# Patient Record
Sex: FEMALE | Race: WHITE | NOT HISPANIC OR LATINO | Employment: FULL TIME | URBAN - METROPOLITAN AREA
[De-identification: names, ages, dates, MRNs, and addresses within clinical notes are randomized per-mention and may not be internally consistent; named-entity substitution may affect disease eponyms.]

---

## 2017-03-28 ENCOUNTER — APPOINTMENT (OUTPATIENT)
Dept: LAB | Facility: HOSPITAL | Age: 42
End: 2017-03-28
Attending: OBSTETRICS & GYNECOLOGY
Payer: COMMERCIAL

## 2017-03-28 ENCOUNTER — TRANSCRIBE ORDERS (OUTPATIENT)
Dept: ADMINISTRATIVE | Facility: HOSPITAL | Age: 42
End: 2017-03-28

## 2017-03-28 DIAGNOSIS — N95.1 SYMPTOMATIC MENOPAUSAL OR FEMALE CLIMACTERIC STATES: Primary | ICD-10-CM

## 2017-03-28 LAB — VENIPUNCTURE: NORMAL

## 2017-03-28 PROCEDURE — 36415 COLL VENOUS BLD VENIPUNCTURE: CPT | Performed by: OBSTETRICS & GYNECOLOGY

## 2017-11-07 ENCOUNTER — ANESTHESIA EVENT (OUTPATIENT)
Dept: GASTROENTEROLOGY | Facility: AMBULARY SURGERY CENTER | Age: 42
End: 2017-11-07
Payer: COMMERCIAL

## 2017-11-07 RX ORDER — MULTIVITAMIN
1 TABLET ORAL DAILY
COMMUNITY

## 2017-11-07 NOTE — PRE-PROCEDURE INSTRUCTIONS
Pre-Surgery Instructions:   Medication Instructions    Multiple Vitamin (MULTIVITAMIN) tablet Patient was instructed by Physician and understands

## 2017-11-08 ENCOUNTER — HOSPITAL ENCOUNTER (OUTPATIENT)
Facility: AMBULARY SURGERY CENTER | Age: 42
Setting detail: OUTPATIENT SURGERY
Discharge: HOME | End: 2017-11-08
Attending: INTERNAL MEDICINE | Admitting: INTERNAL MEDICINE
Payer: COMMERCIAL

## 2017-11-08 ENCOUNTER — ANESTHESIA (OUTPATIENT)
Dept: GASTROENTEROLOGY | Facility: AMBULARY SURGERY CENTER | Age: 42
End: 2017-11-08
Payer: COMMERCIAL

## 2017-11-08 VITALS
BODY MASS INDEX: 21.9 KG/M2 | OXYGEN SATURATION: 99 % | WEIGHT: 144 LBS | RESPIRATION RATE: 18 BRPM | SYSTOLIC BLOOD PRESSURE: 115 MMHG | HEART RATE: 57 BPM | DIASTOLIC BLOOD PRESSURE: 59 MMHG | TEMPERATURE: 97.1 F

## 2017-11-08 DIAGNOSIS — Z80.0 FAMILY HISTORY OF MALIGNANT NEOPLASM OF DIGESTIVE ORGAN: ICD-10-CM

## 2017-11-08 DIAGNOSIS — Z86.010 HISTORY OF COLONIC POLYPS: ICD-10-CM

## 2017-11-08 PROCEDURE — 88305 TISSUE EXAM BY PATHOLOGIST: CPT | Performed by: INTERNAL MEDICINE

## 2017-11-08 RX ORDER — SODIUM CHLORIDE 9 MG/ML
125 INJECTION, SOLUTION INTRAVENOUS CONTINUOUS
Status: DISCONTINUED | OUTPATIENT
Start: 2017-11-08 | End: 2017-11-08 | Stop reason: HOSPADM

## 2017-11-08 RX ORDER — SODIUM CHLORIDE, SODIUM LACTATE, POTASSIUM CHLORIDE, CALCIUM CHLORIDE 600; 310; 30; 20 MG/100ML; MG/100ML; MG/100ML; MG/100ML
INJECTION, SOLUTION INTRAVENOUS CONTINUOUS PRN
Status: DISCONTINUED | OUTPATIENT
Start: 2017-11-08 | End: 2017-11-08 | Stop reason: SURG

## 2017-11-08 RX ORDER — PROPOFOL 10 MG/ML
INJECTION, EMULSION INTRAVENOUS AS NEEDED
Status: DISCONTINUED | OUTPATIENT
Start: 2017-11-08 | End: 2017-11-08 | Stop reason: SURG

## 2017-11-08 RX ADMIN — PROPOFOL 20 MG: 10 INJECTION, EMULSION INTRAVENOUS at 08:19

## 2017-11-08 RX ADMIN — PROPOFOL 20 MG: 10 INJECTION, EMULSION INTRAVENOUS at 08:25

## 2017-11-08 RX ADMIN — PROPOFOL 20 MG: 10 INJECTION, EMULSION INTRAVENOUS at 08:27

## 2017-11-08 RX ADMIN — PROPOFOL 20 MG: 10 INJECTION, EMULSION INTRAVENOUS at 08:21

## 2017-11-08 RX ADMIN — PROPOFOL 20 MG: 10 INJECTION, EMULSION INTRAVENOUS at 08:16

## 2017-11-08 RX ADMIN — SODIUM CHLORIDE, SODIUM LACTATE, POTASSIUM CHLORIDE, AND CALCIUM CHLORIDE: .6; .31; .03; .02 INJECTION, SOLUTION INTRAVENOUS at 08:00

## 2017-11-08 RX ADMIN — PROPOFOL 20 MG: 10 INJECTION, EMULSION INTRAVENOUS at 08:17

## 2017-11-08 RX ADMIN — PROPOFOL 20 MG: 10 INJECTION, EMULSION INTRAVENOUS at 08:23

## 2017-11-08 RX ADMIN — PROPOFOL 80 MG: 10 INJECTION, EMULSION INTRAVENOUS at 08:15

## 2017-11-08 NOTE — OP NOTE
OPERATIVE REPORT  PATIENT NAME: Carolyne Barksdale    :  1975  MRN: [id number]  Pt Location: [place] [address] GI ROOM 01    SURGERY DATE: 2017    Surgeon(s) and Role:     Mona Burrell MD - Primary    Preop Diagnosis:  History of colonic polyps [Z86 010]  Family history of malignant neoplasm of digestive organ [Z80 0]  Abdominal pain    Post-Op Diagnosis Codes:     * History of colonic polyps [Z86 010]     * Family history of malignant neoplasm of digestive organ [Z80 0]     * Internal hemorrhoid [K64 8]    Procedure(s) (LRB):  COLONOSCOPY (N/A) with cold biopsy polypectomy    Specimen(s):  ID Type Source Tests Collected by Time Destination   1 : bx terminal ileum Tissue Large Intestine, Cecum TISSUE EXAM Shalonda Cerna MD 2017 0725    2 : sigmoid polyp Tissue Large Intestine, Sigmoid Colon TISSUE EXAM Shalonda Cerna MD 2017 [id number]        Estimated Blood Loss:   Minimal        Anesthesia Type:   IV Sedation with Anesthesia    Operative Indications:  History of colonic polyps [Z86 010]  Family history of malignant neoplasm of digestive organ [Z80 0]      Operative Findings:  Colonoscopy-under conscious sedation, digital rectal exam and perianal examination was done  Pediatric colonoscope was passed from anus and reach all the way up to the cecum  Cecum landmark was identified with ileocecal valve and appendiceal orifice  Quality of prep was good  Terminal ileum was intubated which appeared normal   Multiple biopsies were done to rule out Crohn's disease  Cecum, ascending colon, transverse colon, splenic flexure, descending colon and sigmoid colon mucosa appeared normal  Small diminutive polyp in the sigmoid colon which was removed with cold biopsy polypectomy  In the rectum on retroflexion, there is a small internal hemorrhoid    Impression-1  Diminutive colon polyp  2  Normal colonic mucosa, normal terminal ileum    Recommendation-1  Follow-up biopsies  2  Repeat colonoscopy in 5 years  3    Schedule for EGD for GERD, abdominal pain and bloating    Complications:   None    Patient Disposition:  PACU     SIGNATURE: Jalyn Laurent MD  DATE: November 8, 2017  TIME: 8:38 AM

## 2017-11-08 NOTE — ANESTHESIA POSTPROCEDURE EVALUATION
Post-Op Assessment Note      CV Status:  Stable    Mental Status:  Alert and awake    Hydration Status:  Euvolemic    PONV Controlled:  Controlled    Airway Patency:  Patent    Post Op Vitals Reviewed: Yes          Staff: Anesthesiologist           BP (!) 94/45 (11/08/17 0831)    Temp     Pulse 56 (11/08/17 0831)   Resp 18 (11/08/17 0831)    SpO2 98 % (11/08/17 0831)

## 2017-11-08 NOTE — ANESTHESIA PREPROCEDURE EVALUATION
Review of Systems/Medical History  Patient summary reviewed  Chart reviewed  No history of anesthetic complications     Cardiovascular   Pulmonary  Smoker cigarette smoker 5-10 packs per year , Tobacco cessation counseling given, ,        GI/Hepatic    GERD ,             Endo/Other     GYN       Hematology   Musculoskeletal       Neurology   Psychology           Physical Exam    Airway    Mallampati score: II  TM Distance: >3 FB  Neck ROM: full     Dental   No notable dental hx     Cardiovascular  Cardiovascular exam normal    Pulmonary  Pulmonary exam normal     Other Findings        Anesthesia Plan  ASA Score- 2       Anesthesia Type- IV sedation with anesthesia with ASA Monitors  Additional Monitors:   Airway Plan:           Induction- intravenous        Informed Consent-

## 2017-11-08 NOTE — H&P
History and Physical -  [facility]  Jaime Musa 38 y o  female MRN: [id number]    HPI: Jaime Musa is a 38y o  year old female who presents with history of colon polyp      Review of Systems    Historical Information   Past Medical History:   Diagnosis Date    GERD (gastroesophageal reflux disease)      Past Surgical History:   Procedure Laterality Date    COLONOSCOPY      OOPHORECTOMY Right     2001     Social History   History   Alcohol Use    Yes     Comment: socially on weekends     History   Drug Use No     History   Smoking Status    Current Some Day Smoker    Packs/day: 0 50    Years: 20 00    Types: Cigarettes   Smokeless Tobacco    Never Used     History reviewed  No pertinent family history  Meds/Allergies     Prescriptions Prior to Admission   Medication    Multiple Vitamin (MULTIVITAMIN) tablet    naproxen (NAPROSYN) 500 mg tablet       Allergies   Allergen Reactions    Shellfish-Derived Products        Objective     Blood pressure 100/63, pulse 57, temperature (!) 97 1 °F (36 2 °C), temperature source Tympanic, resp  rate 18, weight 65 3 kg (144 lb), SpO2 99 %, not currently breastfeeding        PHYSICAL EXAM    Gen: NAD  CV: RRR  CHEST: Clear  ABD: soft, NT/ND  EXT: no edema  Neuro: AAO      ASSESSMENT/PLAN:  This is a 38y o  year old female here for elective colonoscopy     PLAN:   Procedure:  Risk and benefit was discuss and will proceed with conscious sedation

## 2021-12-16 ENCOUNTER — OFFICE VISIT (OUTPATIENT)
Dept: URGENT CARE | Facility: CLINIC | Age: 46
End: 2021-12-16
Payer: COMMERCIAL

## 2021-12-16 VITALS
TEMPERATURE: 99.4 F | BODY MASS INDEX: 21.29 KG/M2 | DIASTOLIC BLOOD PRESSURE: 72 MMHG | HEART RATE: 104 BPM | SYSTOLIC BLOOD PRESSURE: 110 MMHG | WEIGHT: 140 LBS | RESPIRATION RATE: 16 BRPM | OXYGEN SATURATION: 98 %

## 2021-12-16 DIAGNOSIS — R68.89 FLU-LIKE SYMPTOMS: Primary | ICD-10-CM

## 2021-12-16 PROCEDURE — 99213 OFFICE O/P EST LOW 20 MIN: CPT | Performed by: FAMILY MEDICINE

## 2021-12-16 RX ORDER — ACETAMINOPHEN 325 MG/1
650 TABLET ORAL EVERY 6 HOURS PRN
COMMUNITY

## 2021-12-16 RX ORDER — OSELTAMIVIR PHOSPHATE 75 MG/1
75 CAPSULE ORAL EVERY 12 HOURS SCHEDULED
Qty: 10 CAPSULE | Refills: 0 | Status: SHIPPED | OUTPATIENT
Start: 2021-12-16 | End: 2021-12-21

## 2021-12-28 ENCOUNTER — APPOINTMENT (OUTPATIENT)
Dept: RADIOLOGY | Facility: CLINIC | Age: 46
End: 2021-12-28
Payer: COMMERCIAL

## 2021-12-28 DIAGNOSIS — R05.1 ACUTE COUGH: ICD-10-CM

## 2021-12-28 PROCEDURE — 71046 X-RAY EXAM CHEST 2 VIEWS: CPT

## 2022-03-28 ENCOUNTER — TELEPHONE (OUTPATIENT)
Dept: OTHER | Facility: OTHER | Age: 47
End: 2022-03-28

## 2022-05-13 ENCOUNTER — OFFICE VISIT (OUTPATIENT)
Dept: URGENT CARE | Facility: CLINIC | Age: 47
End: 2022-05-13
Payer: COMMERCIAL

## 2022-05-13 VITALS
TEMPERATURE: 97.5 F | SYSTOLIC BLOOD PRESSURE: 138 MMHG | HEIGHT: 66 IN | RESPIRATION RATE: 16 BRPM | OXYGEN SATURATION: 98 % | BODY MASS INDEX: 22.5 KG/M2 | WEIGHT: 140 LBS | DIASTOLIC BLOOD PRESSURE: 73 MMHG | HEART RATE: 62 BPM

## 2022-05-13 DIAGNOSIS — J02.9 SORE THROAT: Primary | ICD-10-CM

## 2022-05-13 PROCEDURE — 99213 OFFICE O/P EST LOW 20 MIN: CPT | Performed by: FAMILY MEDICINE

## 2022-05-13 NOTE — PROGRESS NOTES
[facility]        NAME: Hollie Irwin is a 41 y o  female  : 1975    MRN: [id number]  DATE: May 13, 2022  TIME: 4:18 PM    Assessment and Plan   Sore throat [J02 9]  1  Sore throat  phenol (CHLORASEPTIC) 1 4 % mucosal liquid     Exam mostly normal except for mild inflammation of the nasal cavity  Sore throat likely secondary to postnasal drip  Chloraseptic throat spray for relief  Advised on gargling with warm salt water and to hydrate  Patient Instructions     Follow up with PCP in 3-5 days  Proceed to  ER if symptoms worsen  Chief Complaint     Chief Complaint   Patient presents with    Cold Like Symptoms     Headache, cough, throat dry scratchy 1 day         History of Present Illness     73-year-old female presents today with URI symptoms including sore throat, coughing with burning chest pain and some postnasal drip  Also reports having a severe headache associated with lightheadedness and some visual disturbance  All symptoms started this morning upon waking up  Reports having occasional headaches comparable to a current symptoms over the past few months requiring eyedrops for relief; unsure of eyedrop type  Review of Systems   Review of Systems   Constitutional: Negative for chills and fever  HENT: Positive for postnasal drip and sore throat  Negative for congestion, rhinorrhea and voice change  Eyes: Positive for visual disturbance  Respiratory: Positive for cough  Negative for shortness of breath and wheezing  Cardiovascular: Positive for chest pain (burn with coughs)  Gastrointestinal: Negative for abdominal pain and nausea  Neurological: Positive for light-headedness and headaches       Current Medications       Current Outpatient Medications:     Multiple Vitamin (MULTIVITAMIN) tablet, Take 1 tablet by mouth daily, Disp: , Rfl:     phenol (CHLORASEPTIC) 1 4 % mucosal liquid, Apply 1 spray to the mouth or throat every 2 (two) hours as needed (Sore throat), Disp: 20 mL, Rfl: 0    acetaminophen (TYLENOL) 325 mg tablet, Take 650 mg by mouth every 6 (six) hours as needed for mild pain, Disp: , Rfl:     Current Allergies     Allergies as of 05/13/2022 - Reviewed 05/13/2022   Allergen Reaction Noted    Povidone iodine Other (See Comments) 12/16/2021    Shellfish-derived products - food allergy  10/03/2016            The following portions of the patient's history were reviewed and updated as appropriate: allergies, current medications, past family history, past medical history, past social history, past surgical history and problem list      Past Medical History:   Diagnosis Date    GERD (gastroesophageal reflux disease)        Past Surgical History:   Procedure Laterality Date    COLONOSCOPY      OOPHORECTOMY Right     2001    [place] COLSC FLX W/RMVL OF TUMOR POLYP LESION SNARE TQ N/A 11/8/2017    Procedure: COLONOSCOPY;  Surgeon: Jose Miguel Beasley MD;  Location: [place] [address] GI LAB; Service: Gastroenterology       No family history on file  Medications have been verified  Objective   /73   Pulse 62   Temp 97 5 °F (36 4 °C)   Resp 16   Ht 5' 6" (1 676 m)   Wt 63 5 kg (140 lb)   SpO2 98%   BMI 22 60 kg/m²   No LMP recorded  Patient is postmenopausal        Physical Exam     Physical Exam  Vitals and nursing note reviewed  Constitutional:       General: She is not in acute distress  Appearance: Normal appearance  She is normal weight  She is not ill-appearing, toxic-appearing or diaphoretic  HENT:      Head: Normocephalic and atraumatic  Nose: No congestion or rhinorrhea  Comments: Mildly inflamed nasal mucosa     Mouth/Throat:      Mouth: Mucous membranes are moist       Pharynx: Oropharynx is clear  No posterior oropharyngeal erythema  Eyes:      General:         Right eye: No discharge  Left eye: No discharge        Conjunctiva/sclera: Conjunctivae normal    Cardiovascular:      Rate and Rhythm: Normal rate and regular rhythm  Pulmonary:      Effort: Pulmonary effort is normal  No respiratory distress  Breath sounds: Normal breath sounds  No wheezing, rhonchi or rales  Skin:     General: Skin is warm  Findings: No erythema  Neurological:      General: No focal deficit present  Mental Status: She is alert and oriented to person, place, and time  Psychiatric:         Mood and Affect: Mood normal          Behavior: Behavior normal          Thought Content:  Thought content normal          Judgment: Judgment normal

## 2022-05-17 ENCOUNTER — OFFICE VISIT (OUTPATIENT)
Dept: URGENT CARE | Facility: CLINIC | Age: 47
End: 2022-05-17
Payer: COMMERCIAL

## 2022-05-17 VITALS
HEART RATE: 71 BPM | WEIGHT: 140 LBS | BODY MASS INDEX: 22.5 KG/M2 | TEMPERATURE: 98.6 F | HEIGHT: 66 IN | OXYGEN SATURATION: 98 %

## 2022-05-17 DIAGNOSIS — B34.9 ACUTE VIRAL DISEASE: Primary | ICD-10-CM

## 2022-05-17 PROCEDURE — 87636 SARSCOV2 & INF A&B AMP PRB: CPT | Performed by: PHYSICIAN ASSISTANT

## 2022-05-17 PROCEDURE — 99213 OFFICE O/P EST LOW 20 MIN: CPT | Performed by: PHYSICIAN ASSISTANT

## 2022-05-17 NOTE — PROGRESS NOTES
[facility]        NAME: Alfreda Guillen is a 41 y o  female  : 1975    MRN: [id number]  DATE: May 17, 2022  TIME: 6:14 PM    Assessment and Plan   Acute viral disease [B34 9]  1  Acute viral disease  Covid/Flu-Office Collect       Patient Instructions   Acute viral illness, COVID vs influenza  Combo swab done, mychart for results  Self isolation until results received  Recommend mucinex for cough/congestion  Can also use flonase nasal spray  Rest, fluids and supportive care  May benefit from a cool mist humidifier on night stand  Tylenol/ibuprofen as needed for pain/fever  Any worsening of symptoms or respiratory distress go to ER    Follow up with PCP in 3-5 days  Proceed to  ER if symptoms worsen  Chief Complaint     Chief Complaint   Patient presents with    Cold Like Symptoms     3 Days cold like symptoms sinus hurts          History of Present Illness       Susanne Trujillo is a 53-year-old female who presents to clinic complaining of sore throat x4 days  She is also complaining of nasal congestion, bilateral ear pain, cough, and fever x2 days  She describes the cut cough is dry and nonproductive  She is unsure of how high her fever was she does not have a thermometer  She denies any fatigue, myalgias, sinus pain or pressure, nausea, vomiting, chills, diarrhea, loss of taste or smell, recent travel, or exposure to anyone known COVID positive  Review of Systems   Review of Systems   Constitutional: Positive for fever  Negative for chills and fatigue  HENT: Positive for congestion, ear pain and sore throat  Negative for rhinorrhea, sinus pressure and sinus pain  Respiratory: Positive for cough  Negative for shortness of breath  Gastrointestinal: Negative for diarrhea, nausea and vomiting  Musculoskeletal: Negative for myalgias  Neurological: Negative for headaches           Current Medications       Current Outpatient Medications:     acetaminophen (TYLENOL) 325 mg tablet, Take 650 mg by mouth every 6 (six) hours as needed for mild pain, Disp: , Rfl:     Multiple Vitamin (MULTIVITAMIN) tablet, Take 1 tablet by mouth daily, Disp: , Rfl:     phenol (CHLORASEPTIC) 1 4 % mucosal liquid, Apply 1 spray to the mouth or throat every 2 (two) hours as needed (Sore throat), Disp: 20 mL, Rfl: 0    Current Allergies     Allergies as of 05/17/2022 - Reviewed 05/17/2022   Allergen Reaction Noted    Povidone iodine Other (See Comments) 12/16/2021    Shellfish-derived products - food allergy  10/03/2016            The following portions of the patient's history were reviewed and updated as appropriate: allergies, current medications, past family history, past medical history, past social history, past surgical history and problem list      Past Medical History:   Diagnosis Date    GERD (gastroesophageal reflux disease)        Past Surgical History:   Procedure Laterality Date    COLONOSCOPY      OOPHORECTOMY Right     2001    [place] COLSC FLX W/RMVL OF TUMOR POLYP LESION SNARE TQ N/A 11/8/2017    Procedure: COLONOSCOPY;  Surgeon: Pierre Verde MD;  Location: [place] [address] GI LAB; Service: Gastroenterology       No family history on file  Medications have been verified  Objective   Pulse 71   Temp 98 6 °F (37 °C)   Ht 5' 6" (1 676 m)   Wt 63 5 kg (140 lb)   SpO2 98%   BMI 22 60 kg/m²   No LMP recorded  Patient is postmenopausal        Physical Exam     Physical Exam  Vitals reviewed  Constitutional:       General: She is not in acute distress  Appearance: Normal appearance  She is not ill-appearing  HENT:      Right Ear: Tympanic membrane, ear canal and external ear normal       Left Ear: Tympanic membrane, ear canal and external ear normal       Nose: Congestion present  Mouth/Throat:      Mouth: Mucous membranes are moist       Pharynx: No oropharyngeal exudate or posterior oropharyngeal erythema  Cardiovascular:      Rate and Rhythm: Normal rate and regular rhythm  Heart sounds: Normal heart sounds  Pulmonary:      Effort: Pulmonary effort is normal       Breath sounds: Normal breath sounds  Lymphadenopathy:      Cervical: No cervical adenopathy  Neurological:      Mental Status: She is alert and oriented to person, place, and time     Psychiatric:         Mood and Affect: Mood normal          Behavior: Behavior normal

## 2022-05-18 LAB
FLUAV RNA RESP QL NAA+PROBE: NEGATIVE
FLUBV RNA RESP QL NAA+PROBE: NEGATIVE
SARS-COV-2 RNA RESP QL NAA+PROBE: POSITIVE

## 2022-10-27 ENCOUNTER — TELEPHONE (OUTPATIENT)
Dept: GASTROENTEROLOGY | Facility: CLINIC | Age: 47
End: 2022-10-27

## 2022-10-27 NOTE — TELEPHONE ENCOUNTER
Recall ltr for colon with Dr Ferrer Shape due to hx of hyperplastic, adenoma polyps, fam hx of colon ca due 11/9/22

## 2022-12-01 ENCOUNTER — PREP FOR PROCEDURE (OUTPATIENT)
Dept: GASTROENTEROLOGY | Facility: CLINIC | Age: 47
End: 2022-12-01

## 2022-12-01 ENCOUNTER — TELEPHONE (OUTPATIENT)
Dept: GASTROENTEROLOGY | Facility: CLINIC | Age: 47
End: 2022-12-01

## 2022-12-01 DIAGNOSIS — Z86.010 HISTORY OF COLON POLYPS: ICD-10-CM

## 2022-12-01 DIAGNOSIS — Z80.0 FAMILY HISTORY OF COLON CANCER: Primary | ICD-10-CM

## 2022-12-01 NOTE — TELEPHONE ENCOUNTER
Scheduled date of colonoscopy (as of today): 12/28/22    Physician performing colonoscopy: Charli    Location of colonoscopy: [facility]

## 2022-12-01 NOTE — TELEPHONE ENCOUNTER
[facility] Assessment    Name: Hector Mott  YOB: 1975  Last Height: 5' 7"  Last weight: 63 5 kg (140 lb)  BMI: 22  Procedure: Colonoscopy  Diagnosis: Hx of Polyps  Date of procedure: 12/28/22  Prep: ?  Responsible : Elena/relative  Phone#: [phone]  Name completing form: Jaskaran Martinez  Date form completed: 12/01/22      If the patient answers yes to any of these questions, schedule in a hospital  Are you pregnant: No  Do you rely on a wheelchair for mobility: No  Have you been diagnosed with End Stage Renal Disease (ESRD): No  Do you need oxygen during the day: No  Have you had a heart attack or stroke within the past three months: No  Have you had a seizure within the past three months: No  Have you ever been informed by anesthesia that you have a difficult airway: No  Additional Questions  Have you had any cardiac testing or are under the care of a Cardiologist (see cardiac list): No  Cardiac list:   Do you have an implanted cardiac defibrillator: No (Comment:  This patient should be scheduled in the hospital)    Have any bleeding problems, such as anemia or hemophilia (If patient has H&H result below 8, schedule in hospital   H&H must be within 30 days of procedure): No    Had an organ transplant within the past 3 months: No    Do you have any present infections: No  Do you get short of breath when walking a few blocks: No  Have you been diagnosed with diabetes: No  Comments (provide cardiac provider information if applicable):

## 2022-12-22 ENCOUNTER — TELEPHONE (OUTPATIENT)
Dept: GASTROENTEROLOGY | Facility: CLINIC | Age: 47
End: 2022-12-22

## 2022-12-22 NOTE — TELEPHONE ENCOUNTER
Spoke to pt confirming pt's colonoscopy scheduled on 12/28/22 at [facility] with Dr Ana Lilia Salinas  Informed [facility] would be calling 1-2 days prior with the arrival time  Informed of clear liquid diet day prior as well as the bowel cleansing preparation  Informed would need a  the day of the procedure due to being under sedation  Pt did not receive instructions so I emailed to her

## 2022-12-28 ENCOUNTER — ANESTHESIA EVENT (OUTPATIENT)
Dept: GASTROENTEROLOGY | Facility: AMBULATORY SURGERY CENTER | Age: 47
End: 2022-12-28

## 2022-12-28 ENCOUNTER — ANESTHESIA (OUTPATIENT)
Dept: GASTROENTEROLOGY | Facility: AMBULATORY SURGERY CENTER | Age: 47
End: 2022-12-28

## 2022-12-28 ENCOUNTER — HOSPITAL ENCOUNTER (OUTPATIENT)
Dept: GASTROENTEROLOGY | Facility: AMBULATORY SURGERY CENTER | Age: 47
Discharge: HOME | End: 2022-12-28

## 2022-12-28 VITALS
HEART RATE: 68 BPM | RESPIRATION RATE: 18 BRPM | DIASTOLIC BLOOD PRESSURE: 72 MMHG | SYSTOLIC BLOOD PRESSURE: 108 MMHG | OXYGEN SATURATION: 100 %

## 2022-12-28 DIAGNOSIS — Z80.0 FAMILY HISTORY OF COLON CANCER: ICD-10-CM

## 2022-12-28 DIAGNOSIS — Z86.010 HISTORY OF COLON POLYPS: ICD-10-CM

## 2022-12-28 RX ORDER — PROPOFOL 10 MG/ML
INJECTION, EMULSION INTRAVENOUS AS NEEDED
Status: DISCONTINUED | OUTPATIENT
Start: 2022-12-28 | End: 2022-12-28

## 2022-12-28 RX ORDER — SODIUM CHLORIDE 9 MG/ML
INJECTION, SOLUTION INTRAVENOUS CONTINUOUS PRN
Status: DISCONTINUED | OUTPATIENT
Start: 2022-12-28 | End: 2022-12-28

## 2022-12-28 RX ADMIN — PROPOFOL 30 MG: 10 INJECTION, EMULSION INTRAVENOUS at 09:56

## 2022-12-28 RX ADMIN — SODIUM CHLORIDE: 9 INJECTION, SOLUTION INTRAVENOUS at 09:45

## 2022-12-28 RX ADMIN — PROPOFOL 20 MG: 10 INJECTION, EMULSION INTRAVENOUS at 09:51

## 2022-12-28 RX ADMIN — PROPOFOL 30 MG: 10 INJECTION, EMULSION INTRAVENOUS at 10:04

## 2022-12-28 RX ADMIN — PROPOFOL 30 MG: 10 INJECTION, EMULSION INTRAVENOUS at 10:06

## 2022-12-28 RX ADMIN — PROPOFOL 100 MG: 10 INJECTION, EMULSION INTRAVENOUS at 09:49

## 2022-12-28 RX ADMIN — PROPOFOL 50 MG: 10 INJECTION, EMULSION INTRAVENOUS at 09:54

## 2022-12-28 RX ADMIN — PROPOFOL 30 MG: 10 INJECTION, EMULSION INTRAVENOUS at 10:01

## 2022-12-28 RX ADMIN — PROPOFOL 30 MG: 10 INJECTION, EMULSION INTRAVENOUS at 09:52

## 2022-12-28 NOTE — H&P
History and Physical -  [facility]  Derinda Paget 41 y o  female MRN: [id number]                  HPI: Derinda Paget is a 41y o  year old female who presents for screening colonoscopy, high risk, family history of colon cancer, last colonoscopy was 5 years ago      REVIEW OF SYSTEMS: Per the HPI, and otherwise unremarkable  Historical Information   Past Medical History:   Diagnosis Date   • Colon polyp    • GERD (gastroesophageal reflux disease)      Past Surgical History:   Procedure Laterality Date   • COLONOSCOPY     • OOPHORECTOMY Right        • [place] COLSC FLX W/RMVL OF TUMOR POLYP LESION SNARE TQ N/A 2017    Procedure: COLONOSCOPY;  Surgeon: Salvador Mcqueen MD;  Location: [place] [address] GI LAB; Service: Gastroenterology     Social History   Social History     Substance and Sexual Activity   Alcohol Use Yes    Comment: socially on weekends     Social History     Substance and Sexual Activity   Drug Use No     Social History     Tobacco Use   Smoking Status Former   • Packs/day: 0 50   • Years: 20 00   • Pack years: 10 00   • Types: Cigarettes   • Quit date:    • Years since quittin 9   Smokeless Tobacco Never     Family History   Problem Relation Age of Onset   • Colon cancer Mother        Meds/Allergies     (Not in a hospital admission)      Allergies   Allergen Reactions   • Povidone Iodine Other (See Comments)     unknown   • Shellfish-Derived Products - Food Allergy        Objective     /57   Pulse 74   Resp 16   SpO2 98%       PHYSICAL EXAM    Gen: NAD  CV: RRR  CHEST: Clear  ABD: soft, NT/ND  EXT: no edema      ASSESSMENT/PLAN:  This is a 41y o  year old female here for screening colonoscopy, and she is stable and optimized for her procedure

## 2022-12-28 NOTE — ANESTHESIA POSTPROCEDURE EVALUATION
Post-Op Assessment Note    CV Status:  Stable    Pain management: adequate     Mental Status:  Sleepy   Hydration Status:  Euvolemic   PONV Controlled:  Controlled   Airway Patency:  Patent      Post Op Vitals Reviewed: Yes      Staff: Anesthesiologist, CRNA         No notable events documented      BP   106/67   Temp      Pulse  68   Resp   23   SpO2   97

## 2022-12-28 NOTE — ANESTHESIA PREPROCEDURE EVALUATION
Procedure:  COLONOSCOPY    Relevant Problems   ANESTHESIA (within normal limits)      CARDIO   (-) HTN (hypertension)   (-) Hyperlipidemia      ENDO   (-) Diabetes mellitus, type 2 (HCC)   (-) Hyperthyroidism   (-) Hypothyroidism      GI/HEPATIC   (+) GERD (gastroesophageal reflux disease)      /RENAL (within normal limits)      GYN (within normal limits)      HEMATOLOGY (within normal limits)      MUSCULOSKELETAL (within normal limits)      NEURO/PSYCH (within normal limits)      PULMONARY   (-) Asthma   (-) Sleep apnea        Physical Exam    Airway    Mallampati score: II  TM Distance: >3 FB  Neck ROM: full     Dental       Cardiovascular      Pulmonary      Other Findings        Anesthesia Plan  ASA Score- 2     Anesthesia Type- IV sedation with anesthesia with ASA Monitors  Additional Monitors:   Airway Plan:           Plan Factors-Exercise tolerance (METS): >4 METS  Chart reviewed  EKG reviewed  Existing labs reviewed  Patient summary reviewed  Patient is a current smoker  Induction- intravenous  Postoperative Plan-     Informed Consent- Anesthetic plan and risks discussed with patient  I personally reviewed this patient with the CRNA  Discussed and agreed on the Anesthesia Plan with the CRNA  Nat Inman

## 2025-03-27 ENCOUNTER — OFFICE VISIT (OUTPATIENT)
Dept: URGENT CARE | Facility: CLINIC | Age: 50
End: 2025-03-27
Payer: COMMERCIAL

## 2025-03-27 ENCOUNTER — APPOINTMENT (OUTPATIENT)
Dept: RADIOLOGY | Facility: CLINIC | Age: 50
End: 2025-03-27
Payer: COMMERCIAL

## 2025-03-27 ENCOUNTER — TELEPHONE (OUTPATIENT)
Dept: OTHER | Facility: OTHER | Age: 50
End: 2025-03-27

## 2025-03-27 VITALS
SYSTOLIC BLOOD PRESSURE: 126 MMHG | TEMPERATURE: 97.8 F | BODY MASS INDEX: 19.85 KG/M2 | WEIGHT: 131 LBS | HEART RATE: 78 BPM | RESPIRATION RATE: 18 BRPM | OXYGEN SATURATION: 100 % | HEIGHT: 68 IN | DIASTOLIC BLOOD PRESSURE: 70 MMHG

## 2025-03-27 DIAGNOSIS — F32.0 CURRENT MILD EPISODE OF MAJOR DEPRESSIVE DISORDER WITHOUT PRIOR EPISODE (HCC): ICD-10-CM
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
## 2025-03-27 DIAGNOSIS — M53.3 COCCYALGIA: ICD-10-CM

## 2025-03-27 PROCEDURE — 72220 X-RAY EXAM SACRUM TAILBONE: CPT

## 2025-03-27 PROCEDURE — 99213 OFFICE O/P EST LOW 20 MIN: CPT | Performed by: FAMILY MEDICINE

## 2025-03-27 RX ORDER — NAPROXEN 500 MG/1
500 TABLET ORAL 2 TIMES DAILY WITH MEALS
Qty: 14 TABLET | Refills: 0 | Status: SHIPPED | OUTPATIENT
Start: 2025-03-27 | End: 2025-04-03

## 2025-03-27 NOTE — TELEPHONE ENCOUNTER
Pt reached answering service, she states she was at urgent care today and recommended to call our behavioral health team for an appt. Pt prefers to be seen at the [place] location, I provided her w/intake p# and info.

## 2025-03-27 NOTE — PROGRESS NOTES
[facility]        NAME: Adelita Villalba is a 50 y.o. female  : 1975    MRN: [id number]  DATE: 2025  TIME: 12:33 PM    Assessment and Plan   Fall down steps, initial encounter [W10.8XXA]  1. Fall down steps, initial encounter  XR sacrum and coccyx      2. Coccyalgia  Ambulatory Referral to Orthopedic Surgery    naproxen (Naprosyn) 500 mg tablet        Headache from head trauma resolved; no signs or symptoms of a concussion.  Left arm with faint ecchymosis.  Patient advised on icing.  No involvement with range of motion; fracture unlikely.  No lumbar spine tenderness.  Sacral x-ray reveals possible displaced fracture of the lower sacrum involving the foramina; official read pending.    Referred to Ortho for further evaluation and management.  Strongly recommended using a donut cushion when seated.  Recommended adequate hydration for softer stooling.  Naproxen prescribed for pain control.    Patient Instructions     Follow up with PCP in 3-5 days.  Proceed to  ER if symptoms worsen.    If tests have been performed at [facility], our office will contact you with results if changes need to be made to the care plan discussed with you at the visit.  You can review your full results on [facility].    Chief Complaint     Chief Complaint   Patient presents with    Fall     The patient states she fell three days ago down stairs of basement ( 16 steps down). C/O left arm pain,tail bone and back of the head. Took Advil for the pain. The patient report tail bone pain is getting worst ans pain is worst when standing, sitting, or laying down.         History of Present Illness       50-year-old female pertinent history of osteopenia presents today due to multiple injuries from a fall while sliding down a flight of steps.  3 days ago, she was descending a flight of steps in her home when she slipped and fell backwards hitting her tailbone, scraping her lower back, injuring the left upper extremity,  and hitting the back of her head.  At the bottom of the stairs, she sat for about 5 minutes to ensure that nothing was broken.  Does recall experiencing significant nausea that day followed by headache the following day which eventually resolved.  Today she reports significant bruising and tenderness on the posterior aspect of the left arm near the triceps and buttock pain with sitting, standing and laying down.  Of note, has recently discovered that her partner of over 2 decades was unfaithful for over the past 6 months.  The to share 2 daughters; one 19-year-old and one 15-year-old.  They have not yet made a final decision on whether or not to part ways.  She does report feeling that she no longer wants to live, but denies suicidal ideation stating that she wants to be there for her children.    Fall  Associated symptoms include headaches and nausea. Pertinent negatives include no abdominal pain or fever.       Review of Systems   Review of Systems   Constitutional:  Negative for chills and fever.   Eyes:  Negative for photophobia and visual disturbance.   Respiratory:  Negative for cough and shortness of breath.    Cardiovascular:  Negative for chest pain.   Gastrointestinal:  Positive for nausea. Negative for abdominal pain.   Neurological:  Positive for headaches. Negative for dizziness.     Current Medications       Current Outpatient Medications:     Multiple Vitamin (MULTIVITAMIN) tablet, Take 1 tablet by mouth daily, Disp: , Rfl:     naproxen (Naprosyn) 500 mg tablet, Take 1 tablet (500 mg total) by mouth 2 (two) times a day with meals for 7 days, Disp: 14 tablet, Rfl: 0    acetaminophen (TYLENOL) 325 mg tablet, Take 650 mg by mouth every 6 (six) hours as needed for mild pain, Disp: , Rfl:     phenol (CHLORASEPTIC) 1.4 % mucosal liquid, Apply 1 spray to the mouth or throat every 2 (two) hours as needed (Sore throat), Disp: 20 mL, Rfl: 0    Current Allergies     Allergies as of 03/27/2025 - Reviewed  "12/28/2022   Allergen Reaction Noted    Povidone iodine Other (See Comments) 12/16/2021    Shellfish-derived products - food allergy  10/03/2016            The following portions of the patient's history were reviewed and updated as appropriate: allergies, current medications, past family history, past medical history, past social history, past surgical history and problem list.     Past Medical History:   Diagnosis Date    Colon polyp     GERD (gastroesophageal reflux disease)        Past Surgical History:   Procedure Laterality Date    COLONOSCOPY      OOPHORECTOMY Right     2001    AK COLSC FLX W/RMVL OF TUMOR POLYP LESION SNARE TQ N/A 11/8/2017    Procedure: COLONOSCOPY;  Surgeon: Juve Augustin MD;  Location: [facility] GI LAB;  Service: Gastroenterology       Family History   Problem Relation Age of Onset    Colon cancer Mother          Medications have been verified.        Objective   /70   Pulse 78   Temp 97.8 °F (36.6 °C)   Resp 18   Ht 5' 8\" (1.727 m)   Wt 59.4 kg (131 lb)   SpO2 100%   BMI 19.92 kg/m²   No LMP recorded. Patient is postmenopausal.       Physical Exam     Physical Exam  Vitals and nursing note reviewed.   Constitutional:       General: She is in acute distress (Weight shifted to the side when seated due to coccyalgia.).      Appearance: Normal appearance. She is normal weight. She is not ill-appearing, toxic-appearing or diaphoretic.   HENT:      Head: Normocephalic and atraumatic.   Eyes:      General:         Right eye: No discharge.         Left eye: No discharge.      Conjunctiva/sclera: Conjunctivae normal.   Cardiovascular:      Rate and Rhythm: Normal rate and regular rhythm.   Pulmonary:      Effort: Pulmonary effort is normal. No respiratory distress.      Breath sounds: Normal breath sounds. No wheezing, rhonchi or rales.   Musculoskeletal:         General: Tenderness (Distal left triceps) and signs of injury present. No swelling or deformity.   Skin:     General: Skin is " warm.      Findings: Bruising (Posterior aspect of left upper arm; faint.) and lesion (Mild abrasions over the lumbar spinous processes) present. No erythema.   Neurological:      General: No focal deficit present.      Mental Status: She is alert and oriented to person, place, and time.   Psychiatric:         Behavior: Behavior normal.         Thought Content: Thought content normal.         Judgment: Judgment normal.      Comments: Somewhat tearful when discussing relational issue

## 2025-03-31 ENCOUNTER — TELEPHONE (OUTPATIENT)
Age: 50
End: 2025-03-31

## 2025-03-31 NOTE — TELEPHONE ENCOUNTER
Patient has been added to the Talk Therapy wait list with a referral.    Insurance: [facility]  Insurance Type:    Commercial [x]   Medicaid []   [facility] (if applicable)   Medicare []  [place] Preference[place]  Provider Preference: no pref  Virtual: Yes [x] No []  Were outside resources sent: Yes [x] No []

## 2025-07-25 ENCOUNTER — TELEPHONE (OUTPATIENT)
Age: 50
End: 2025-07-25

## (undated) DEVICE — TUBING BUBBLE CLEAR 5MM X 100 FT NS

## (undated) DEVICE — TUBING AUX CHANNEL

## (undated) DEVICE — "MH-438 A/W VLVE F/140 EVIS-140": Brand: AIR/WATER VALVE

## (undated) DEVICE — GAUZE SPONGES,16 PLY: Brand: CURITY

## (undated) DEVICE — 1200CC GUARDIAN II: Brand: GUARDIAN

## (undated) DEVICE — LUBRICANT SURGILUBE TUBE 4 OZ  FLIP TOP

## (undated) DEVICE — "MH-443 SUCTION VALVE F/EVIS140 EVIS160": Brand: SUCTION VALVE

## (undated) DEVICE — BRUSH ENDO CLEANING DBL-HEADER

## (undated) DEVICE — "MAJ-901 WATER CONTAINER SET CV-160/140": Brand: WATER CONTAINER

## (undated) DEVICE — MEDI-VAC YANKAUER SUCTION HANDLE: Brand: CARDINAL HEALTH

## (undated) DEVICE — GLOVE EXAM NON-STRL NTRL PLUS LRG PF

## (undated) DEVICE — SOLIDIFIER FLUID WASTE CONTROL 1500ML

## (undated) DEVICE — DISPOSABLE BIOPSY VALVE MAJ-1555: Brand: SINGLE USE BIOPSY VALVE (STERILE)

## (undated) DEVICE — AIRLIFE™  ADULT CUSHION NASAL CANNULA WITH 7 FOOT (2.1 M) CRUSH-RESISTANT OXYGEN TUBING, AND U/CONNECT-IT ADAPTER: Brand: AIRLIFE™

## (undated) DEVICE — SINGLE-USE BIOPSY FORCEPS: Brand: RADIAL JAW 4